# Patient Record
Sex: FEMALE | Race: WHITE | URBAN - METROPOLITAN AREA
[De-identification: names, ages, dates, MRNs, and addresses within clinical notes are randomized per-mention and may not be internally consistent; named-entity substitution may affect disease eponyms.]

---

## 2017-03-04 ENCOUNTER — APPOINTMENT (OUTPATIENT)
Dept: CT IMAGING | Age: 47
End: 2017-03-04
Attending: EMERGENCY MEDICINE
Payer: COMMERCIAL

## 2017-03-04 ENCOUNTER — HOSPITAL ENCOUNTER (EMERGENCY)
Age: 47
Discharge: HOME | End: 2017-03-04
Attending: EMERGENCY MEDICINE
Payer: COMMERCIAL

## 2017-03-04 ENCOUNTER — APPOINTMENT (OUTPATIENT)
Dept: GENERAL RADIOLOGY | Age: 47
End: 2017-03-04
Attending: EMERGENCY MEDICINE
Payer: COMMERCIAL

## 2017-03-04 VITALS
HEART RATE: 94 BPM | TEMPERATURE: 98 F | DIASTOLIC BLOOD PRESSURE: 100 MMHG | BODY MASS INDEX: 30.73 KG/M2 | SYSTOLIC BLOOD PRESSURE: 146 MMHG | WEIGHT: 180 LBS | RESPIRATION RATE: 24 BRPM | HEIGHT: 64 IN | OXYGEN SATURATION: 100 %

## 2017-03-04 DIAGNOSIS — R07.89 CHEST PAIN, ATYPICAL: Primary | ICD-10-CM

## 2017-03-04 LAB
ALBUMIN SERPL-MCNC: 4 G/DL (ref 3.5–4.8)
ALP LIVER SERPL-CCNC: 110 U/L (ref 37–98)
ALT SERPL-CCNC: 33 U/L (ref 14–54)
AST SERPL-CCNC: 20 U/L (ref 15–41)
BASOPHILS # BLD AUTO: 0.06 X10(3) UL (ref 0–0.1)
BASOPHILS NFR BLD AUTO: 0.5 %
BILIRUB SERPL-MCNC: 0.3 MG/DL (ref 0.1–2)
BUN BLD-MCNC: 11 MG/DL (ref 8–20)
CALCIUM BLD-MCNC: 8.9 MG/DL (ref 8.3–10.3)
CHLORIDE: 106 MMOL/L (ref 101–111)
CO2: 27 MMOL/L (ref 22–32)
CREAT BLD-MCNC: 0.74 MG/DL (ref 0.55–1.02)
D-DIMER: <0.27 UG/ML FEU (ref 0–0.49)
EOSINOPHIL # BLD AUTO: 0.27 X10(3) UL (ref 0–0.3)
EOSINOPHIL NFR BLD AUTO: 2.2 %
ERYTHROCYTE [DISTWIDTH] IN BLOOD BY AUTOMATED COUNT: 12 % (ref 11.5–16)
GLUCOSE BLD-MCNC: 95 MG/DL (ref 70–99)
HCT VFR BLD AUTO: 47.2 % (ref 34–50)
HGB BLD-MCNC: 15.9 G/DL (ref 12–16)
IMMATURE GRANULOCYTE COUNT: 0.03 X10(3) UL (ref 0–1)
IMMATURE GRANULOCYTE RATIO %: 0.2 %
LYMPHOCYTES # BLD AUTO: 4.51 X10(3) UL (ref 0.9–4)
LYMPHOCYTES NFR BLD AUTO: 37 %
M PROTEIN MFR SERPL ELPH: 7.9 G/DL (ref 6.1–8.3)
MCH RBC QN AUTO: 30.9 PG (ref 27–33.2)
MCHC RBC AUTO-ENTMCNC: 33.7 G/DL (ref 31–37)
MCV RBC AUTO: 91.8 FL (ref 81–100)
MONOCYTES # BLD AUTO: 1.02 X10(3) UL (ref 0.1–0.6)
MONOCYTES NFR BLD AUTO: 8.4 %
NEUTROPHIL ABS PRELIM: 6.29 X10 (3) UL (ref 1.3–6.7)
NEUTROPHILS # BLD AUTO: 6.29 X10(3) UL (ref 1.3–6.7)
NEUTROPHILS NFR BLD AUTO: 51.7 %
PLATELET # BLD AUTO: 280 10(3)UL (ref 150–450)
POTASSIUM SERPL-SCNC: 3.5 MMOL/L (ref 3.6–5.1)
RBC # BLD AUTO: 5.14 X10(6)UL (ref 3.8–5.1)
RED CELL DISTRIBUTION WIDTH-SD: 40.8 FL (ref 35.1–46.3)
SODIUM SERPL-SCNC: 140 MMOL/L (ref 136–144)
TROPONIN: <0.046 NG/ML (ref ?–0.05)
WBC # BLD AUTO: 12.2 X10(3) UL (ref 4–13)

## 2017-03-04 PROCEDURE — 99285 EMERGENCY DEPT VISIT HI MDM: CPT

## 2017-03-04 PROCEDURE — 93010 ELECTROCARDIOGRAM REPORT: CPT

## 2017-03-04 PROCEDURE — 85378 FIBRIN DEGRADE SEMIQUANT: CPT | Performed by: EMERGENCY MEDICINE

## 2017-03-04 PROCEDURE — 85025 COMPLETE CBC W/AUTO DIFF WBC: CPT | Performed by: EMERGENCY MEDICINE

## 2017-03-04 PROCEDURE — 71275 CT ANGIOGRAPHY CHEST: CPT

## 2017-03-04 PROCEDURE — 80053 COMPREHEN METABOLIC PANEL: CPT | Performed by: EMERGENCY MEDICINE

## 2017-03-04 PROCEDURE — 84484 ASSAY OF TROPONIN QUANT: CPT | Performed by: EMERGENCY MEDICINE

## 2017-03-04 PROCEDURE — 71010 XR CHEST AP PORTABLE  (CPT=71010): CPT

## 2017-03-04 PROCEDURE — 99284 EMERGENCY DEPT VISIT MOD MDM: CPT

## 2017-03-04 PROCEDURE — 96374 THER/PROPH/DIAG INJ IV PUSH: CPT

## 2017-03-04 PROCEDURE — 93005 ELECTROCARDIOGRAM TRACING: CPT

## 2017-03-04 RX ORDER — ASPIRIN 81 MG/1
324 TABLET, CHEWABLE ORAL ONCE
Status: COMPLETED | OUTPATIENT
Start: 2017-03-04 | End: 2017-03-04

## 2017-03-04 RX ORDER — ALBUTEROL SULFATE 90 UG/1
2 AEROSOL, METERED RESPIRATORY (INHALATION) EVERY 4 HOURS PRN
Qty: 1 INHALER | Refills: 0 | Status: SHIPPED | OUTPATIENT
Start: 2017-03-04 | End: 2017-04-03

## 2017-03-04 NOTE — ED INITIAL ASSESSMENT (HPI)
Tightness in Right side of chest and back pain, shortness of breath started last night.   Denies any other associated sx

## 2017-03-05 LAB
ATRIAL RATE: 98 BPM
P AXIS: 62 DEGREES
P-R INTERVAL: 164 MS
Q-T INTERVAL: 352 MS
QRS DURATION: 86 MS
QTC CALCULATION (BEZET): 449 MS
R AXIS: 59 DEGREES
T AXIS: 55 DEGREES
VENTRICULAR RATE: 98 BPM

## 2017-03-05 NOTE — ED PROVIDER NOTES
Patient Seen in: THE [facility] Emergency Department In [place]    History   Patient presents with:  Chest Pain Angina (cardiovascular)    Stated Complaint: chest pain     HPI  Patient is a 46-year-old female who states that for the past week she has had pain coronary artery disease   • Cancer Mother      brain    no history of premature coronary artery disease      Smoking Status: Smoker, Current Status Unknown  Packs/Day: 0.50  Years: 24        Types: Cigarettes    Smokeless Status: Current User (14) - Abnormal; Notable for the following:     Alkaline Phosphatase 110 (*)     Potassium 3.5 (*)     All other components within normal limits   CBC W/ DIFFERENTIAL - Abnormal; Notable for the following:     RBC 5.14 (*)     Lymphocyte Absolute 4.51 (*) subcentimeter pulmonary nodules in along the left major fissure. Followup CT the chest in 6 months is suggested to assess for stability/resolution.     3. Minimal uncomplicated colonic diverticulosis.         MDM   Patient was given aspirin.   Patient had

## 2018-07-29 ENCOUNTER — APPOINTMENT (OUTPATIENT)
Dept: GENERAL RADIOLOGY | Facility: HOSPITAL | Age: 48
End: 2018-07-29
Attending: EMERGENCY MEDICINE
Payer: COMMERCIAL

## 2018-07-29 ENCOUNTER — HOSPITAL ENCOUNTER (EMERGENCY)
Facility: HOSPITAL | Age: 48
Discharge: HOME | End: 2018-07-29
Attending: EMERGENCY MEDICINE
Payer: COMMERCIAL

## 2018-07-29 VITALS
HEIGHT: 64 IN | DIASTOLIC BLOOD PRESSURE: 90 MMHG | RESPIRATION RATE: 19 BRPM | OXYGEN SATURATION: 93 % | HEART RATE: 101 BPM | TEMPERATURE: 98 F | SYSTOLIC BLOOD PRESSURE: 148 MMHG | BODY MASS INDEX: 29.02 KG/M2 | WEIGHT: 170 LBS

## 2018-07-29 DIAGNOSIS — R07.89 CHEST WALL PAIN: Primary | ICD-10-CM

## 2018-07-29 LAB
ATRIAL RATE: 91 BPM
P AXIS: 47 DEGREES
P-R INTERVAL: 162 MS
Q-T INTERVAL: 368 MS
QRS DURATION: 86 MS
QTC CALCULATION (BEZET): 452 MS
R AXIS: 63 DEGREES
T AXIS: 67 DEGREES
VENTRICULAR RATE: 91 BPM

## 2018-07-29 PROCEDURE — 93010 ELECTROCARDIOGRAM REPORT: CPT

## 2018-07-29 PROCEDURE — 99284 EMERGENCY DEPT VISIT MOD MDM: CPT

## 2018-07-29 PROCEDURE — 93005 ELECTROCARDIOGRAM TRACING: CPT

## 2018-07-29 PROCEDURE — 71101 X-RAY EXAM UNILAT RIBS/CHEST: CPT | Performed by: EMERGENCY MEDICINE

## 2018-07-29 RX ORDER — IBUPROFEN 600 MG/1
600 TABLET ORAL EVERY 8 HOURS PRN
Qty: 30 TABLET | Refills: 0 | Status: SHIPPED | OUTPATIENT
Start: 2018-07-29 | End: 2018-08-05

## 2018-07-29 RX ORDER — HYDROCODONE BITARTRATE AND ACETAMINOPHEN 5; 325 MG/1; MG/1
1 TABLET ORAL ONCE
Status: DISCONTINUED | OUTPATIENT
Start: 2018-07-29 | End: 2018-07-29
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
## 2018-07-29 NOTE — ED NOTES
Pt back from x ray, pt refusing Empire for pain, states she does not like pills. Dr. Linda Mitchell had discontinued lab work, awaiting xray results. Pt provided with ice pack for pain to rt side chest/rib area. SO at bedside attentively.

## 2018-07-30 NOTE — ED AVS SNAPSHOT
THE [facility] Emergency Department in [address] [facility]    Phone:  [phone]    Fax:  [address]   MRN: [id number]    Department:  THE [facility] Emergency Department in [place]   Date of V IF THERE IS ANY CHANGE OR WORSENING OF YOUR CONDITION, CALL YOUR PRIMARY CARE PHYSICIAN AT ONCE OR RETURN IMMEDIATELY TO THE EMERGENCY DEPARTMENT.     If you have been prescribed any medication(s), please fill your prescription right away and begin taking t (0) swallows foods/liquids without difficulty

## 2019-04-23 ENCOUNTER — HOSPITAL ENCOUNTER (OUTPATIENT)
Dept: MAMMOGRAPHY | Facility: HOSPITAL | Age: 49
Discharge: HOME | End: 2019-04-23
Attending: FAMILY MEDICINE
Payer: COMMERCIAL

## 2019-04-23 DIAGNOSIS — N63.10 BREAST MASS, RIGHT: ICD-10-CM

## 2019-04-23 PROCEDURE — 77062 BREAST TOMOSYNTHESIS BI: CPT | Performed by: FAMILY MEDICINE

## 2019-04-23 PROCEDURE — 77066 DX MAMMO INCL CAD BI: CPT | Performed by: FAMILY MEDICINE

## 2019-05-30 ENCOUNTER — HOSPITAL ENCOUNTER (OUTPATIENT)
Dept: MAMMOGRAPHY | Age: 49
Discharge: HOME | End: 2019-05-30
Attending: FAMILY MEDICINE
Payer: COMMERCIAL

## 2019-05-30 ENCOUNTER — HOSPITAL ENCOUNTER (OUTPATIENT)
Dept: ULTRASOUND IMAGING | Age: 49
Discharge: HOME | End: 2019-05-30
Attending: FAMILY MEDICINE
Payer: COMMERCIAL

## 2019-05-30 DIAGNOSIS — R92.2 INCONCLUSIVE MAMMOGRAM: ICD-10-CM

## 2019-05-30 PROCEDURE — 76642 ULTRASOUND BREAST LIMITED: CPT | Performed by: FAMILY MEDICINE

## 2019-05-30 PROCEDURE — 77066 DX MAMMO INCL CAD BI: CPT | Performed by: FAMILY MEDICINE

## 2019-05-30 PROCEDURE — 77062 BREAST TOMOSYNTHESIS BI: CPT | Performed by: FAMILY MEDICINE

## 2022-06-05 ENCOUNTER — HOSPITAL ENCOUNTER (EMERGENCY)
Age: 52
Discharge: LEFT BEFORE DISCHARGE | End: 2022-06-05
Attending: EMERGENCY MEDICINE
Payer: COMMERCIAL

## 2022-06-05 ENCOUNTER — APPOINTMENT (OUTPATIENT)
Dept: GENERAL RADIOLOGY | Age: 52
End: 2022-06-05
Attending: EMERGENCY MEDICINE
Payer: COMMERCIAL

## 2022-06-05 VITALS
RESPIRATION RATE: 15 BRPM | HEART RATE: 86 BPM | TEMPERATURE: 98 F | OXYGEN SATURATION: 99 % | DIASTOLIC BLOOD PRESSURE: 89 MMHG | WEIGHT: 170 LBS | SYSTOLIC BLOOD PRESSURE: 127 MMHG | HEIGHT: 64 IN | BODY MASS INDEX: 29.02 KG/M2

## 2022-06-05 DIAGNOSIS — R07.9 ACUTE CHEST PAIN: Primary | ICD-10-CM

## 2022-06-05 DIAGNOSIS — Z53.29 LEFT AGAINST MEDICAL ADVICE: ICD-10-CM

## 2022-06-05 LAB
ALBUMIN SERPL-MCNC: 3.7 G/DL (ref 3.4–5)
ALBUMIN/GLOB SERPL: 1 {RATIO} (ref 1–2)
ALP LIVER SERPL-CCNC: 98 U/L
ALT SERPL-CCNC: 23 U/L
ANION GAP SERPL CALC-SCNC: 5 MMOL/L (ref 0–18)
AST SERPL-CCNC: 18 U/L (ref 15–37)
BASOPHILS # BLD AUTO: 0.05 X10(3) UL (ref 0–0.2)
BASOPHILS NFR BLD AUTO: 0.4 %
BILIRUB SERPL-MCNC: 0.4 MG/DL (ref 0.1–2)
BUN BLD-MCNC: 10 MG/DL (ref 7–18)
CALCIUM BLD-MCNC: 9.3 MG/DL (ref 8.5–10.1)
CHLORIDE SERPL-SCNC: 106 MMOL/L (ref 98–112)
CO2 SERPL-SCNC: 27 MMOL/L (ref 21–32)
CREAT BLD-MCNC: 0.7 MG/DL
EOSINOPHIL # BLD AUTO: 0.21 X10(3) UL (ref 0–0.7)
EOSINOPHIL NFR BLD AUTO: 1.6 %
ERYTHROCYTE [DISTWIDTH] IN BLOOD BY AUTOMATED COUNT: 12.2 %
GLOBULIN PLAS-MCNC: 3.8 G/DL (ref 2.8–4.4)
GLUCOSE BLD-MCNC: 102 MG/DL (ref 70–99)
HCT VFR BLD AUTO: 45.6 %
HGB BLD-MCNC: 15 G/DL
IMM GRANULOCYTES # BLD AUTO: 0.04 X10(3) UL (ref 0–1)
IMM GRANULOCYTES NFR BLD: 0.3 %
LYMPHOCYTES # BLD AUTO: 3.81 X10(3) UL (ref 1–4)
LYMPHOCYTES NFR BLD AUTO: 29 %
MCH RBC QN AUTO: 30.7 PG (ref 26–34)
MCHC RBC AUTO-ENTMCNC: 32.9 G/DL (ref 31–37)
MCV RBC AUTO: 93.3 FL
MONOCYTES # BLD AUTO: 1 X10(3) UL (ref 0.1–1)
MONOCYTES NFR BLD AUTO: 7.6 %
NEUTROPHILS # BLD AUTO: 8.04 X10 (3) UL (ref 1.5–7.7)
NEUTROPHILS # BLD AUTO: 8.04 X10(3) UL (ref 1.5–7.7)
NEUTROPHILS NFR BLD AUTO: 61.1 %
OSMOLALITY SERPL CALC.SUM OF ELEC: 285 MOSM/KG (ref 275–295)
PLATELET # BLD AUTO: 313 10(3)UL (ref 150–450)
POTASSIUM SERPL-SCNC: 3.8 MMOL/L (ref 3.5–5.1)
PROT SERPL-MCNC: 7.5 G/DL (ref 6.4–8.2)
RBC # BLD AUTO: 4.89 X10(6)UL
SODIUM SERPL-SCNC: 138 MMOL/L (ref 136–145)
TROPONIN I HIGH SENSITIVITY: 5 NG/L
WBC # BLD AUTO: 13.2 X10(3) UL (ref 4–11)

## 2022-06-05 PROCEDURE — 80053 COMPREHEN METABOLIC PANEL: CPT | Performed by: EMERGENCY MEDICINE

## 2022-06-05 PROCEDURE — 96374 THER/PROPH/DIAG INJ IV PUSH: CPT

## 2022-06-05 PROCEDURE — 85025 COMPLETE CBC W/AUTO DIFF WBC: CPT | Performed by: EMERGENCY MEDICINE

## 2022-06-05 PROCEDURE — 99284 EMERGENCY DEPT VISIT MOD MDM: CPT

## 2022-06-05 PROCEDURE — 84484 ASSAY OF TROPONIN QUANT: CPT | Performed by: EMERGENCY MEDICINE

## 2022-06-05 PROCEDURE — 71045 X-RAY EXAM CHEST 1 VIEW: CPT | Performed by: EMERGENCY MEDICINE

## 2022-06-05 PROCEDURE — 93010 ELECTROCARDIOGRAM REPORT: CPT

## 2022-06-05 PROCEDURE — 93005 ELECTROCARDIOGRAM TRACING: CPT

## 2022-06-05 RX ORDER — DIAZEPAM 10 MG/1
10 TABLET ORAL ONCE
Status: DISCONTINUED | OUTPATIENT
Start: 2022-06-05 | End: 2022-06-05

## 2022-06-05 RX ORDER — LISINOPRIL 10 MG/1
10 TABLET ORAL DAILY
COMMUNITY

## 2022-06-05 RX ORDER — KETOROLAC TROMETHAMINE 30 MG/ML
30 INJECTION, SOLUTION INTRAMUSCULAR; INTRAVENOUS ONCE
Status: COMPLETED | OUTPATIENT
Start: 2022-06-05 | End: 2022-06-05

## 2022-06-05 NOTE — ED QUICK NOTES
-Upon arrival to the ER, security called the charge nurse and stated the patient and her  had a \"short fuse\" in triage.   -The patient's  called this ER before arrival, asking for \"advice\" on what to do because his wife woke up with right sided chest pain. They were both told to call 911 if it was chest pain, instead they drove to this ER.  -Patient threatened to \"river\" the ER tech if she is really having a heart attack because Epic was slow to come up.  -Patient's  has been to the nurses station several times with c/o \"that his wife is in severe pain\". -Registration came to the desk to say \"how rude\" the patient was to her, patient's  completed registering patient and signed consent.

## 2022-06-05 NOTE — ED QUICK NOTES
-Patient refusing COVID testing stating, \"Your not testing me for COVID, that's stupid, I don't have COVID\". I explained the admission process to the patient, she states, \"I'm not getting admitted, this place is not up to [facility] . This is just a satellite hospital that obviously doesn't know what they are doing\". -Green top had to be redrawn for hemolyzed specimen. Patient states, Dorothea Trevino do you have to redrawn blood, did you forget something? \"

## 2022-06-05 NOTE — ED QUICK NOTES
IV was removed by respiratory therapist, patient and her  left the ER without signing the [facility] paperwork nor receiving her discharge instructions.

## 2022-06-05 NOTE — ED QUICK NOTES
-Patient called ER tech \"stupid\"  Charge nurse went to speak with the patient. Charge   RN states, \"Patient states that the MD is just a pill pusher. That she wanted her results, not just something that masked the pain\". Patient stated, \"pain 10/10 but refused to take any other pain medications\". Patient states, \"I've been here an hour and you guys have done nothing for me\". By that point EKG was completed, labs, vitals, chest xray were all obtained, results were pending\".

## 2022-06-05 NOTE — ED INITIAL ASSESSMENT (HPI)
Pt presents to ER with \"sharp\" substernal CP that woke her up at 0300. +Dry cough for the past week.

## 2022-06-14 LAB
ATRIAL RATE: 90 BPM
P AXIS: 76 DEGREES
P-R INTERVAL: 190 MS
Q-T INTERVAL: 372 MS
QRS DURATION: 82 MS
QTC CALCULATION (BEZET): 455 MS
R AXIS: 66 DEGREES
T AXIS: 69 DEGREES
VENTRICULAR RATE: 90 BPM

## (undated) NOTE — ED AVS SNAPSHOT
THE [facility] Emergency Department in [address] [facility]    Phone:  [phone]    Fax:  [address]   MRN: [id number]    Department:  THE [facility] Emergency Department in [place]   Date of V Call Monday morning for an appointment. Take an aspirin daily. No strenuous exertion to workup is complete. Stop smoking. Albuterol as needed. Return if increased pain, shortness of breath, syncope, new or worse symptoms.     Discharge References/[place] Emergency Department. Follow-up care is at the discretion of that Physician. IF THERE IS ANY CHANGE OR WORSENING OF YOUR CONDITION, CALL YOUR PRIMARY CARE PHYSICIAN AT ONCE OR RETURN IMMEDIATELY TO THE EMERGENCY DEPARTMENT.     If you hav harming yourself, contact [facility] at [phone]. - If you don’t have insurance, Bernice Vigil has partnered with Patient [address] to help you get signed up for insurance coverage.   Patient [place] with the largest measuring up to 5 mm. No lobar consolidation. Large airways are widely patent. VASCULATURE:  No CT evidence of acute pulmonary embolism. THORACIC AORTA:  No aortic aneurysm or dissection. MEDIASTINUM/SUE:  Unremarkable.   CARDIAC:  [facility] Support Staff. Remember, MyChart is NOT to be used for urgent needs. For medical emergencies, dial 911.

## (undated) NOTE — ED AVS SNAPSHOT
Alona Kraft   MRN: [id number]    Department:  [facility] Emergency Department   Date of Visit:  7/29/2018           Disclosure     Insurance plans vary and the physician(s) referred by the ER may not be covered by your plan.  Please contac tell this physician (or your personal doctor if your instructions are to return to your personal doctor) about any new or lasting problems. The primary care or specialist physician will see patients referred from the [facility] Emergency Department.  Yaakov Burnham

## (undated) NOTE — LETTER
Date & Time: 6/5/2022, 5:52 AM  Patient: Taylor Pina  Encounter Provider(s):    Nnamdi Whiting, DO         This certifies that I, Taylor Pina, a patient at an [facility] facility, am leaving the facility voluntarily and against the advice of my physician. I acknowledge that I have been:    1. informed that my physician believes that I need to receive care here;  2. informed that if I leave, I could become sicker or even die; and  3. provided discharge instructions consistent with my current diagnosis. I hereby release my physician, the facility, and its employees from all responsibility for any ill effects which may result from this action. __________________________________  Patient or authorized caregiver signature    __________________________________  RN signature    If no patient or patient representative signature was obtained, sign below to acknowledge that the form was reviewed with the patient and that the patient refused to sign.     __________________________________  RN signature